# Patient Record
Sex: MALE | Race: OTHER | NOT HISPANIC OR LATINO
[De-identification: names, ages, dates, MRNs, and addresses within clinical notes are randomized per-mention and may not be internally consistent; named-entity substitution may affect disease eponyms.]

---
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
## 2024-01-01 VITALS — HEART RATE: 80 BPM | SYSTOLIC BLOOD PRESSURE: 122 MMHG | DIASTOLIC BLOOD PRESSURE: 74 MMHG

## 2024-01-02 ENCOUNTER — APPOINTMENT (OUTPATIENT)
Dept: CARE COORDINATION | Facility: CLINIC | Age: 19
End: 2024-01-02
Payer: MEDICAID

## 2024-01-02 PROCEDURE — 99457 RPM TX MGMT 1ST 20 MIN: CPT

## 2024-01-05 NOTE — ASSESSMENT
[FreeTextEntry1] : See summary report dated 1/1/2024 for synchronous communication and billable hours report dated 1/1/2024 for total minutes.   [employer] data reviewed; patient at goal with interactive encounters and monitoring. Plan is to graduate from program as adequate BP control maintained.

## 2024-01-05 NOTE — PLAN
PHYSICAL THERAPY EVALUATION NOTE    Patient Name: Desiree Hernandez  MNJRQ'S Date: 10/24/2019  AGE:   79 y o  Mrn:   [id number]  ADMIT DX:  Hypoxia [R09 02]  Borderline low oxygen saturation level [R79 81]  Severe sepsis (HCC) [A41 9, R65 20]  Open wound of right great toe, initial encounter [S91 101A]    Past Medical History:   Diagnosis Date    Actinic keratosis     Anemia     chronic hemolytic anemia    Atrial fibrillation (HCC)     Benign prostatic hypertrophy     Blood in urine     Cellulitis     Dermatitis     Disease of thyroid gland     Dyslipidemia     Epidural hematoma (HCC)     Epistaxis     Falling     Folate deficiency     GERD (gastroesophageal reflux disease)     Hip fracture (HCC)     Hyperlipidemia     Hypertension     Hypothyroidism (acquired)     Insomnia     Neuropathy     Nocturia     Osteoarthritis     Psychiatric disorder     depression    Renal disorder     Acute renal failure    Sepsis (HCC)     Shoulder pain, right     Squamous cell carcinoma     Subdural hematoma (HCC)     Thrombophlebitis     Urinary retention      Length Of Stay: 1  PHYSICAL THERAPY EVALUATION :    10/24/19 1019   Pain Assessment   Pain Assessment No/denies pain   Home Living   Type of Home SNF  ([facility])   Additional Comments mobilizes in wheelchair  performs pivot transfer w/ assist x2 or mobilized w/ mechanical lift  needs assist w/ ADLs  no falls in last 6 months  no history of supplemental oxygen use  Prior Function   Comments pt seen supine in bed  agreed to participate in PT eval  denied pain or dizziness  pt needed occasional input for task focus  pt wants to go home  Restrictions/Precautions   Other Precautions Contact/isolation;Droplet precautions; Bed Alarm;Multiple lines;Telemetry;O2;Fall Risk   General   Additional Pertinent History spoke to Dr Nunu Owen prior to session - stated pt is appropriate for PT eval and mobility assessment despite pending Podiatry consult  Family/Caregiver Present No   Cognition   Arousal/Participation Cooperative   Orientation Level Oriented to person; Other (Comment)  (pt was identified w/ full name, birth date)   Following Commands Follows one step commands inconsistently   Comments 2L oxygen via nasal cannula  resting pulse ox 98% and 58 BPM, active 94% and 63 BPM    RUE Assessment   RUE Assessment X  (3/5; shoulder 3-/5, elbow extension 3-/5)   LUE Assessment   LUE Assessment X  (3/5; shoulder 3-/5, elbow extension 3-/5)   RLE Assessment   RLE Assessment X  (3-/5)   LLE Assessment   LLE Assessment X  (3-/5, ankle 2+/5)   Coordination   Movements are Fluid and Coordinated 0   Coordination and Movement Description impaired coordination UEs   Bed Mobility   Rolling R 2  Maximal assistance   Additional items Assist x 1; Increased time required;Verbal cues;LE management  (for bedrail use, LE positioning, breathing technique)   Rolling L 2  Maximal assistance   Additional items Assist x 1; Increased time required;Verbal cues;LE management  (for bedrail use, LE positioning, breathing technique)   Supine to Sit 2  Maximal assistance   Additional items Assist x 2;HOB elevated; Increased time required;Verbal cues;LE management  (for bedrail use, breathing technique)   Sit to Supine 2  Maximal assistance   Additional items Assist x 2;Verbal cues;LE management  (for UE positioning, breathing technique)   Additional Comments pt sat edge of bed 6 minutes w/ mod to maxx1  additional sitting not possible due to fatigue  pt was unable to attempt mobilization out of bed  pt needs dependent means for out of bed mobilization     Transfers   Sit to Stand Unable to assess   Balance   Static Sitting Poor -   Static Standing Zero   Activity Tolerance   Activity Tolerance Patient limited by fatigue   Nurse Made Aware spoke to Dr Enrique JAQUEZ, Scott Montoya CM   Assessment   Prognosis Guarded Problem List Decreased strength;Decreased range of motion;Decreased endurance; Impaired balance;Decreased mobility; Decreased coordination;Decreased safety awareness;Decreased skin integrity   Assessment Pt presented to the ER tonight from [facility] with a temp of 101 8  Dx: severe sepsis, UTI, and right great toe open wound  order placed for PT eval and tx, w/ activity order of up and OOB as tolerated  pt presents w/ comorbidities of a-fib, right great toe wound, anemia, a-fib, cellulitis, dyslipidemia, hip fx surgery, HTN, neuropathy, OA, right shoulder pain, and SDH and personal factors of advanced age, mobilizing w/ assistive device, depression, inability to perform IADLs and inability to perform ADLs  pt presents w/ weakness, decreased ROM, decreased endurance, impaired balance, impaired coordination, decreased safety awareness, fall risk and impaired skin integrity  these impairments are evident in findings from physical examination (weakness, decreased ROM, impaired coordination and impaired skin integrity), mobility assessment (need for assist x1 to 2 w/ bed mobility, inability to mobilize out of bed, need for input for mobility and breathing technique), and Barthel Index: 5/100  pt needed input for task focus and mobility technique  pt is at risk for falls due to physical and safety awareness deficits  pt's clinical presentation is unstable/unpredictable (evident in need for assist x 1 to 2 w/ bed mobility, inability to mobilize out of bed, need for supplemental oxygen in order to maintain oxygen saturation and need for input for task focus and mobility technique/safety)  pt needs inpatient PT tx to improve mobility deficits and progress mobility training as appropriate  discharge recommendation is for outpatient PT to reduce fall risk and maximize level of functional independence  Pt needs dependent means for out of bed mobilization     Goals   Patient Goals go home   STG Expiration Date 11/03/19 [_____ Hours] : [unfilled] hours Short Term Goal #1 pt will: Increase bilateral LE strength 1/2 grade to facilitate independent mobility, Perform rolling and repositioning in bed w/ minx1 to decrease fall risk factors, Perform supine <---> sit transitions w/ modx1 to improve independence, Increase static sitting balance 1 grade to decrease risk for falls, Tolerate 3 hr OOB to faciliate upright tolerance, Tolerate seated at EOB 30 minutes w/ supervision to facilitate functional task performance and Improve Barthel Index score to 30 or greater to facilitate independence  PT to see when transfer training is appropriate  Plan   Treatment/Interventions LE strengthening/ROM; Therapeutic exercise; Endurance training;Cognitive reorientation;Patient/family training;Equipment eval/education; Bed mobility  (PT to see when transfer training is appropriate)   PT Frequency 2-3x/wk   Recommendation   Recommendation Other (Comment)  (PT at SNF)   Equipment Recommended Other (Comment)  (pt needs dependent means for out of bed mobilization )   Barthel Index   Feeding 5   Bathing 0   Grooming Score 0   Dressing Score 0   Bladder Score 0   Bowels Score 0   Toilet Use Score 0   Transfers (Bed/Chair) Score 0   Mobility (Level Surface) Score 0   Stairs Score 0   Barthel Index Score 5     Skilled PT recommended while in hospital and upon DC to progress pt toward treatment goals       Alicia Sun, PT [_____ Minutes] : [unfilled] minutes [Discharge] : Discharge

## 2024-01-08 ENCOUNTER — APPOINTMENT (OUTPATIENT)
Dept: INTERNAL MEDICINE | Facility: CLINIC | Age: 19
End: 2024-01-08
Payer: MEDICAID

## 2024-01-08 VITALS
OXYGEN SATURATION: 98 % | SYSTOLIC BLOOD PRESSURE: 141 MMHG | HEIGHT: 67 IN | DIASTOLIC BLOOD PRESSURE: 92 MMHG | BODY MASS INDEX: 33.9 KG/M2 | WEIGHT: 216 LBS | RESPIRATION RATE: 16 BRPM | TEMPERATURE: 97.2 F | HEART RATE: 129 BPM

## 2024-01-08 VITALS — SYSTOLIC BLOOD PRESSURE: 125 MMHG | DIASTOLIC BLOOD PRESSURE: 81 MMHG

## 2024-01-08 DIAGNOSIS — I99.8 OTHER DISORDER OF CIRCULATORY SYSTEM: ICD-10-CM

## 2024-01-08 DIAGNOSIS — E55.9 VITAMIN D DEFICIENCY, UNSPECIFIED: ICD-10-CM

## 2024-01-08 PROCEDURE — G2211 COMPLEX E/M VISIT ADD ON: CPT

## 2024-01-08 PROCEDURE — 99213 OFFICE O/P EST LOW 20 MIN: CPT

## 2024-01-09 PROBLEM — I99.8 FLUCTUATING BLOOD PRESSURE: Status: ACTIVE | Noted: 2024-01-09

## 2024-03-11 ENCOUNTER — APPOINTMENT (OUTPATIENT)
Dept: ENDOCRINOLOGY | Facility: CLINIC | Age: 19
End: 2024-03-11
Payer: MEDICAID

## 2024-03-11 VITALS
OXYGEN SATURATION: 99 % | TEMPERATURE: 97 F | HEIGHT: 67 IN | DIASTOLIC BLOOD PRESSURE: 83 MMHG | HEART RATE: 110 BPM | BODY MASS INDEX: 35 KG/M2 | SYSTOLIC BLOOD PRESSURE: 142 MMHG | WEIGHT: 223 LBS | RESPIRATION RATE: 16 BRPM

## 2024-03-11 DIAGNOSIS — E03.9 HYPOTHYROIDISM, UNSPECIFIED: ICD-10-CM

## 2024-03-11 DIAGNOSIS — E55.9 VITAMIN D DEFICIENCY, UNSPECIFIED: ICD-10-CM

## 2024-03-11 DIAGNOSIS — N50.0 ATROPHY OF TESTIS: ICD-10-CM

## 2024-03-11 DIAGNOSIS — I10 ESSENTIAL (PRIMARY) HYPERTENSION: ICD-10-CM

## 2024-03-11 DIAGNOSIS — Q53.10 UNSPECIFIED UNDESCENDED TESTICLE, UNILATERAL: ICD-10-CM

## 2024-03-11 DIAGNOSIS — E66.9 OBESITY, UNSPECIFIED: ICD-10-CM

## 2024-03-11 PROCEDURE — G2211 COMPLEX E/M VISIT ADD ON: CPT | Mod: NC,1L

## 2024-03-11 PROCEDURE — 99215 OFFICE O/P EST HI 40 MIN: CPT

## 2024-03-11 RX ORDER — KETOCONAZOLE 20.5 MG/ML
2 SHAMPOO, SUSPENSION TOPICAL
Qty: 1 | Refills: 0 | Status: ACTIVE | COMMUNITY
Start: 2020-10-30 | End: 1900-01-01

## 2024-03-11 NOTE — ASSESSMENT
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  Hypothyroidism, primary Diagnosed with hypothyroidism 5-6 years ago. Was in [facility] previously without access to regular medical care. Once [place] the [place], GI doctor who was doing EGD for H. pylori learned that patient's mother has hypothyroidism and patient also had some weight gain and labs showed hypothyroidism. Referred to pediatric endocrinologist. Was started on levothyroxine. Dose ultimately titrated up to 88mcg daily. Previously seen by Rm Lugo, pediatric endocrinologist ([phone]). Taking PO, first thing in morning, empty stomach. Energy is normal. No temperature intolerance, normal bowel movements. -Continue levothyroxine 88mcg daily -Recheck TSH with FT4 6 weeks after resuming 88mcg (was using half dose of mother's 150mcg tab until recently due to running out of his own)  HTN Hypothyroidism can cause increased PVR but unlikely to be the cause now given normal TSH. Secondary workup of HTN normal. Renal US pending.  -F/u with PCP for further workup. Had been offered cardio referral by PCP but patient declined. Right and left UE BP different by 20 mmHg. To discuss further with PCP/cardio. -Obesity can contribute to high BP. Nutrition referral given.  Vit D insufficiency Vit D 21.3.  -Recommend 2000 units D3 daily for goal 25 D >30.  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 Branden Prado but he is located too far from where patient and mother live. They'd have to take public transit and would rather not travel to [place]. Wishes to have low testo eval with me and fertility assessment by urology. -Check AM FSH, LH, testo, SHBG, CBC, PSA, CMP. If any abnormal, will proceed with confirmatory/additional testing. -Reviewed risks vs benefits of testosterone replacement therapy, including but not limited to potential increase in cardiovascular events, CVA, risk of prostate cancer, liver disease.  I spent 40 minutes on today's encounter, including record review, face-to-face care and additional coordination of care.   RTC 3 months  Bin Corado DO.

## 2024-03-11 NOTE — HISTORY OF PRESENT ILLNESS
[FreeTextEntry1] : Pt is a 16 y/o male with keratosis pilaris, hypothyroidism, HLD?, pre-DM?, and R testicular agenesis s/p exploratory laparoscopy and L testicular orchiopexy, presenting for hypothyroidism.  Diagnosed with hypothyroidism 5-6 years ago. Was in [facility] previously without access to regular medical care. [place] the [place], GI doctor who was doing EGD for H. pylori learned that patient's mother has hypothyroidism and patient also had some weight gain and labs showed hypothyroidism. Referred to pediatric endocrinologist. Was started on levothyroxine. Dose ultimately titrated up to 88mcg daily. Previously seen by Rm Lugo, pediatric endocrinologist ([phone]). Taking PO, first thing in morning, empty stomach. Vit D 21.3. On 2000 units daily. Energy is normal. No temperature intolerance, normal bowel movements.  No use of supplements, lithium, amiodarone, hx of radiation to head or neck. No prior PIMENTEL, thyroid surgery or prior treatment of hyperthyroidism. No hx of CAD, arrhythmia, CHF.  FHx of hashimoto's in mother.  Saw urology, referred to Dr. Branden Prado but he is located too far from where patient and mother live. They'd have to take public transit and would rather not travel to [place]. Wishes to have low testo eval with me and fertility assessment by urology.  Denies daytime somnolence, no weight changes, testicular trauma. Had delayed puberty, was around age 15. Was on testo a few times. No ED. Gets morning erections, anosmia, gynecomastia, dental deformities. Injured left knee after falling when kicking a ball at school but does not think it was a fracture. No increased urinary frequency, urgency, nocturia, slow stream, postmicturition dribble symptoms. No n/v/d/c. No change in voice, peripheral vision loss, HA, change in muscle strength, body hair loss, opioid use. No use of supplements. Normal libido.

## 2024-03-11 NOTE — PHYSICAL EXAM
[Alert] : alert [Well Nourished] : well nourished [No Acute Distress] : no acute distress [No Proptosis] : no proptosis [EOMI] : extra ocular movement intact [No Lid Lag] : no lid lag [Supple] : the neck was supple [Thyroid Not Enlarged] : the thyroid was not enlarged [No Thyroid Nodules] : no palpable thyroid nodules [No Accessory Muscle Use] : no accessory muscle use [No Respiratory Distress] : no respiratory distress [Normal Rate and Effort] : normal respiratory rate and effort [Normal Rate] : heart rate was normal [Regular Rhythm] : with a regular rhythm [No Edema] : no peripheral edema [Not Tender] : non-tender [Soft] : abdomen soft [No Spinal Tenderness] : no spinal tenderness [Spine Straight] : spine straight [No Involuntary Movements] : no involuntary movements were seen [Cranial Nerves Intact] : cranial nerves 2-12 were intact [No Motor Deficits] : the motor exam was normal [Normal Reflexes] : deep tendon reflexes were 2+ and symmetric [Oriented x3] : oriented to person, place, and time [Normal Affect] : the affect was normal [Normal Mood] : the mood was normal [Penis Abnormality] : the penis was normal [Scrotum] : the scrotum was normal [Normal Pattern Pubic Hair] : normal male pattern pubic hair [FreeTextEntry1] : Left testis 20cc. Right testis non-palpable. [de-identified] : No gynecomastia.

## 2024-06-11 LAB
25(OH)D3 SERPL-MCNC: 32.3 NG/ML
ALBUMIN SERPL ELPH-MCNC: 4.8 G/DL
ALP BLD-CCNC: 104 U/L
ALT SERPL-CCNC: 16 U/L
ANION GAP SERPL CALC-SCNC: 19 MMOL/L
AST SERPL-CCNC: 19 U/L
BASOPHILS # BLD AUTO: 0.05 K/UL
BASOPHILS NFR BLD AUTO: 0.6 %
BILIRUB SERPL-MCNC: 0.5 MG/DL
BUN SERPL-MCNC: 12 MG/DL
CALCIUM SERPL-MCNC: 9.8 MG/DL
CHLORIDE SERPL-SCNC: 101 MMOL/L
CO2 SERPL-SCNC: 20 MMOL/L
CREAT SERPL-MCNC: 0.73 MG/DL
EGFR: 134 ML/MIN/1.73M2
EOSINOPHIL # BLD AUTO: 0.1 K/UL
EOSINOPHIL NFR BLD AUTO: 1.2 %
FSH SERPL-MCNC: 2.6 IU/L
GLUCOSE SERPL-MCNC: 72 MG/DL
HCT VFR BLD CALC: 41.3 %
HGB BLD-MCNC: 13.6 G/DL
IMM GRANULOCYTES NFR BLD AUTO: 0.4 %
LH SERPL-ACNC: 3.7 IU/L
LYMPHOCYTES # BLD AUTO: 3.17 K/UL
LYMPHOCYTES NFR BLD AUTO: 37.3 %
MAN DIFF?: NORMAL
MCHC RBC-ENTMCNC: 28.6 PG
MCHC RBC-ENTMCNC: 32.9 GM/DL
MCV RBC AUTO: 86.9 FL
MONOCYTES # BLD AUTO: 0.52 K/UL
MONOCYTES NFR BLD AUTO: 6.1 %
NEUTROPHILS # BLD AUTO: 4.62 K/UL
NEUTROPHILS NFR BLD AUTO: 54.4 %
PLATELET # BLD AUTO: 357 K/UL
POTASSIUM SERPL-SCNC: 4.4 MMOL/L
PROT SERPL-MCNC: 7.6 G/DL
PSA FREE FLD-MCNC: 53 %
PSA FREE SERPL-MCNC: 0.26 NG/ML
PSA SERPL-MCNC: 0.5 NG/ML
RBC # BLD: 4.75 M/UL
RBC # FLD: 12 %
SHBG SERPL-SCNC: 15.8 NMOL/L
SODIUM SERPL-SCNC: 141 MMOL/L
T4 FREE SERPL-MCNC: 1.6 NG/DL
TESTOST SERPL-MCNC: 214 NG/DL
TSH SERPL-ACNC: 7.44 UIU/ML
WBC # FLD AUTO: 8.49 K/UL

## 2024-06-11 RX ORDER — LEVOTHYROXINE SODIUM 0.1 MG/1
100 TABLET ORAL
Qty: 90 | Refills: 1 | Status: ACTIVE | COMMUNITY
Start: 2019-11-21 | End: 1900-01-01

## 2024-06-11 RX ORDER — MULTIVIT-MIN/FOLIC/VIT K/LYCOP 400-300MCG
50 MCG TABLET ORAL
Qty: 90 | Refills: 1 | Status: ACTIVE | COMMUNITY
Start: 2023-09-11 | End: 1900-01-01

## 2024-07-08 ENCOUNTER — APPOINTMENT (OUTPATIENT)
Dept: INTERNAL MEDICINE | Facility: CLINIC | Age: 19
End: 2024-07-08

## 2024-08-12 ENCOUNTER — APPOINTMENT (OUTPATIENT)
Dept: ENDOCRINOLOGY | Facility: CLINIC | Age: 19
End: 2024-08-12
Payer: MEDICAID

## 2024-08-12 VITALS
BODY MASS INDEX: 36.1 KG/M2 | DIASTOLIC BLOOD PRESSURE: 79 MMHG | RESPIRATION RATE: 16 BRPM | OXYGEN SATURATION: 98 % | HEART RATE: 81 BPM | SYSTOLIC BLOOD PRESSURE: 159 MMHG | WEIGHT: 230 LBS | TEMPERATURE: 97 F | HEIGHT: 67 IN

## 2024-08-12 DIAGNOSIS — Q53.10 UNSPECIFIED UNDESCENDED TESTICLE, UNILATERAL: ICD-10-CM

## 2024-08-12 DIAGNOSIS — E03.9 HYPOTHYROIDISM, UNSPECIFIED: ICD-10-CM

## 2024-08-12 DIAGNOSIS — E66.9 OBESITY, UNSPECIFIED: ICD-10-CM

## 2024-08-12 DIAGNOSIS — E55.9 VITAMIN D DEFICIENCY, UNSPECIFIED: ICD-10-CM

## 2024-08-12 DIAGNOSIS — N50.0 ATROPHY OF TESTIS: ICD-10-CM

## 2024-08-12 DIAGNOSIS — I10 ESSENTIAL (PRIMARY) HYPERTENSION: ICD-10-CM

## 2024-08-12 DIAGNOSIS — E29.1 TESTICULAR HYPOFUNCTION: ICD-10-CM

## 2024-08-12 PROCEDURE — G2211 COMPLEX E/M VISIT ADD ON: CPT | Mod: NC,1L

## 2024-08-12 PROCEDURE — 99214 OFFICE O/P EST MOD 30 MIN: CPT

## 2024-08-12 NOTE — HISTORY OF PRESENT ILLNESS
[FreeTextEntry1] : Pt is a 16 y/o male with keratosis pilaris, hypothyroidism, HLD?, pre-DM?, and R testicular agenesis s/p exploratory laparoscopy and L testicular orchiopexy, presenting for hypothyroidism.  Diagnosed with hypothyroidism 5-6 years ago. Was in [facility] previously without access to regular medical care. [place] the [place], GI doctor who was doing EGD for H. pylori learned that patient's mother has hypothyroidism and patient also had some weight gain and labs showed hypothyroidism. Referred to pediatric endocrinologist. Was started on levothyroxine. Dose ultimately titrated up to 100mcg daily. Previously seen by Rm Lugo, pediatric endocrinologist ([phone]). Taking PO, first thing in morning, empty stomach. Vit D 32.3. On 2000 units daily. Energy is normal. No temperature intolerance, normal bowel movements.  No use of supplements, lithium, amiodarone, hx of radiation to head or neck. No prior PIMENTEL, thyroid surgery or prior treatment of hyperthyroidism. No hx of CAD, arrhythmia, CHF.  FHx of hashimoto's in mother.  Saw urology, referred to Dr. Branden Prado but he is located too far from where patient and mother live. They'd have to take public transit and would rather not travel to [place]. Wishes to have low testo eval with me and fertility assessment by urology.  Denies daytime somnolence, no weight changes, testicular trauma. Had delayed puberty, was around age 15. Was on testo a few times as a child. No ED. Gets morning erections, anosmia, gynecomastia, dental deformities. Injured left knee after falling when kicking a ball at school but does not think it was a fracture. No increased urinary frequency, urgency, nocturia, slow stream, postmicturition dribble symptoms. No n/v/d/c. No change in voice, peripheral vision loss, HA, change in muscle strength, body hair loss, opioid use. No use of supplements. Normal libido.  Repeat testo 193 with inappropriately normal gonadotropins. Free testo by calculation slightly low. Bioavailable has no reference range at [facility].  Mother with partial empty sella.

## 2024-08-12 NOTE — PHYSICAL EXAM
[Alert] : alert [Well Nourished] : well nourished [No Acute Distress] : no acute distress [EOMI] : extra ocular movement intact [No Proptosis] : no proptosis [Cranial Nerves Intact] : cranial nerves 2-12 were intact [No Motor Deficits] : the motor exam was normal [Oriented x3] : oriented to person, place, and time [Normal Affect] : the affect was normal [Normal Mood] : the mood was normal

## 2024-08-12 NOTE — ASSESSMENT
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  Hypothyroidism, primary Diagnosed with hypothyroidism 5-6 years ago. Was in [facility] previously without access to regular medical care. [place] the [place], [place] doctor who was doing EGD for H. pylori learned that patient's mother has hypothyroidism and patient also had some weight gain and labs showed hypothyroidism. Referred to pediatric endocrinologist. Was started on levothyroxine. Dose ultimately titrated up to 88mcg daily. Previously seen by Rm Lugo, pediatric endocrinologist ([phone]). Taking PO, first thing in morning, empty stomach. Energy is normal. No temperature intolerance, normal bowel movements. -Continue levothyroxine 100mcg daily  HTN Hypothyroidism can cause increased PVR but unlikely to be the cause now given normal TSH. Secondary workup of HTN normal. Renal US previously done? -F/u with PCP for further workup. Had been offered cardio referral by PCP but patient declined.  -Obesity can contribute to high BP. Nutrition referral given.  Vit D insufficiency Vit D now above 30. -Recommend continue 2000 units D3 daily for goal 25 D >30.  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 Branden Prado but he is located too far from where patient and mother live. They'd have to take public transit and would rather not travel to [place]. Wishes to have low testo eval with me and fertility assessment by urology. Repeat AM fasting testo 193 with inappropriately normal gonadotropins. -MRI sella -Check fasting AM IGF-1, PRL, AM cortisol, ACTH, DHEAS, BMP, Fe, transferrin. -Reviewed risks vs benefits of testosterone replacement therapy, including but not limited to potential increase in cardiovascular events, CVA, risk of prostate cancer, liver disease.  RTC 3 months  Bin Corado DO.

## 2024-08-12 NOTE — REASON FOR VISIT
[Follow - Up] : a follow-up visit [Hypothyroidism] : hypothyroidism [Hypogonadism] : hypogonadism [Parent] : parent

## 2024-08-21 ENCOUNTER — APPOINTMENT (OUTPATIENT)
Dept: MRI IMAGING | Facility: CLINIC | Age: 19
End: 2024-08-21
Payer: MEDICAID

## 2024-08-21 PROCEDURE — 70553 MRI BRAIN STEM W/O & W/DYE: CPT

## 2024-08-21 PROCEDURE — A9585: CPT
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
## 2024-11-11 ENCOUNTER — APPOINTMENT (OUTPATIENT)
Dept: ENDOCRINOLOGY | Facility: CLINIC | Age: 19
End: 2024-11-11
Payer: MEDICAID

## 2024-11-11 VITALS
HEART RATE: 86 BPM | TEMPERATURE: 98 F | BODY MASS INDEX: 35.94 KG/M2 | DIASTOLIC BLOOD PRESSURE: 95 MMHG | HEIGHT: 67 IN | RESPIRATION RATE: 16 BRPM | OXYGEN SATURATION: 99 % | SYSTOLIC BLOOD PRESSURE: 168 MMHG | WEIGHT: 229 LBS

## 2024-11-11 VITALS — DIASTOLIC BLOOD PRESSURE: 82 MMHG | SYSTOLIC BLOOD PRESSURE: 145 MMHG

## 2024-11-11 DIAGNOSIS — E03.9 HYPOTHYROIDISM, UNSPECIFIED: ICD-10-CM

## 2024-11-11 DIAGNOSIS — E66.9 OBESITY, UNSPECIFIED: ICD-10-CM

## 2024-11-11 DIAGNOSIS — I10 ESSENTIAL (PRIMARY) HYPERTENSION: ICD-10-CM

## 2024-11-11 DIAGNOSIS — E55.9 VITAMIN D DEFICIENCY, UNSPECIFIED: ICD-10-CM

## 2024-11-11 DIAGNOSIS — Q53.10 UNSPECIFIED UNDESCENDED TESTICLE, UNILATERAL: ICD-10-CM

## 2024-11-11 DIAGNOSIS — E78.00 PURE HYPERCHOLESTEROLEMIA, UNSPECIFIED: ICD-10-CM

## 2024-11-11 DIAGNOSIS — E29.1 TESTICULAR HYPOFUNCTION: ICD-10-CM

## 2024-11-11 DIAGNOSIS — N50.0 ATROPHY OF TESTIS: ICD-10-CM

## 2024-11-11 PROCEDURE — G2211 COMPLEX E/M VISIT ADD ON: CPT | Mod: NC

## 2024-11-11 PROCEDURE — 99214 OFFICE O/P EST MOD 30 MIN: CPT

## 2024-11-14 ENCOUNTER — TRANSCRIPTION ENCOUNTER (OUTPATIENT)
Age: 19
End: 2024-11-14

## 2025-05-05 ENCOUNTER — APPOINTMENT (OUTPATIENT)
Dept: ENDOCRINOLOGY | Facility: CLINIC | Age: 20
End: 2025-05-05
Payer: MEDICAID

## 2025-05-05 VITALS
WEIGHT: 217 LBS | TEMPERATURE: 98 F | OXYGEN SATURATION: 98 % | SYSTOLIC BLOOD PRESSURE: 146 MMHG | DIASTOLIC BLOOD PRESSURE: 93 MMHG | RESPIRATION RATE: 16 BRPM | HEART RATE: 102 BPM

## 2025-05-05 DIAGNOSIS — Q53.10 UNSPECIFIED UNDESCENDED TESTICLE, UNILATERAL: ICD-10-CM

## 2025-05-05 DIAGNOSIS — E66.9 OBESITY, UNSPECIFIED: ICD-10-CM

## 2025-05-05 DIAGNOSIS — N50.0 ATROPHY OF TESTIS: ICD-10-CM

## 2025-05-05 DIAGNOSIS — E03.9 HYPOTHYROIDISM, UNSPECIFIED: ICD-10-CM

## 2025-05-05 DIAGNOSIS — R09.82 POSTNASAL DRIP: ICD-10-CM

## 2025-05-05 DIAGNOSIS — E29.1 TESTICULAR HYPOFUNCTION: ICD-10-CM

## 2025-05-05 DIAGNOSIS — I10 ESSENTIAL (PRIMARY) HYPERTENSION: ICD-10-CM

## 2025-05-05 DIAGNOSIS — E78.00 PURE HYPERCHOLESTEROLEMIA, UNSPECIFIED: ICD-10-CM

## 2025-05-05 PROCEDURE — G2211 COMPLEX E/M VISIT ADD ON: CPT | Mod: NC

## 2025-05-05 PROCEDURE — 99215 OFFICE O/P EST HI 40 MIN: CPT

## 2025-05-05 RX ORDER — CETIRIZINE HYDROCHLORIDE 10 MG/1
10 TABLET, COATED ORAL
Qty: 90 | Refills: 0 | Status: ACTIVE | COMMUNITY
Start: 2025-05-05 | End: 1900-01-01

## 2025-05-06 RX ORDER — TESTOSTERONE CYPIONATE 200 MG/ML
200 INJECTION, SOLUTION INTRAMUSCULAR
Qty: 2 | Refills: 0 | Status: ACTIVE | COMMUNITY
Start: 2025-05-05

## 2025-06-20 ENCOUNTER — APPOINTMENT (OUTPATIENT)
Dept: INTERNAL MEDICINE | Facility: CLINIC | Age: 20
End: 2025-06-20
Payer: MEDICAID

## 2025-06-20 ENCOUNTER — OUTPATIENT (OUTPATIENT)
Dept: OUTPATIENT SERVICES | Facility: HOSPITAL | Age: 20
LOS: 1 days | End: 2025-06-20
Payer: MEDICAID

## 2025-06-20 ENCOUNTER — NON-APPOINTMENT (OUTPATIENT)
Age: 20
End: 2025-06-20

## 2025-06-20 VITALS
OXYGEN SATURATION: 99 % | SYSTOLIC BLOOD PRESSURE: 140 MMHG | HEIGHT: 67 IN | HEART RATE: 120 BPM | BODY MASS INDEX: 34.53 KG/M2 | DIASTOLIC BLOOD PRESSURE: 90 MMHG | TEMPERATURE: 97.2 F | WEIGHT: 220 LBS

## 2025-06-20 DIAGNOSIS — Z98.890 OTHER SPECIFIED POSTPROCEDURAL STATES: Chronic | ICD-10-CM

## 2025-06-20 DIAGNOSIS — Z00.00 ENCOUNTER FOR GENERAL ADULT MEDICAL EXAMINATION WITHOUT ABNORMAL FINDINGS: ICD-10-CM

## 2025-06-20 PROCEDURE — G0463: CPT

## 2025-06-20 PROCEDURE — 99213 OFFICE O/P EST LOW 20 MIN: CPT | Mod: GC,25

## 2025-06-20 PROCEDURE — 99395 PREV VISIT EST AGE 18-39: CPT | Mod: 25,GC

## 2025-06-20 RX ORDER — LOSARTAN POTASSIUM 25 MG/1
25 TABLET, FILM COATED ORAL DAILY
Qty: 1 | Refills: 1 | Status: ACTIVE | COMMUNITY
Start: 2025-06-20 | End: 1900-01-01

## 2025-06-25 DIAGNOSIS — E66.9 OBESITY, UNSPECIFIED: ICD-10-CM

## 2025-06-25 DIAGNOSIS — E03.9 HYPOTHYROIDISM, UNSPECIFIED: ICD-10-CM

## 2025-06-25 DIAGNOSIS — E29.1 TESTICULAR HYPOFUNCTION: ICD-10-CM

## 2025-06-25 DIAGNOSIS — I10 ESSENTIAL (PRIMARY) HYPERTENSION: ICD-10-CM

## 2025-07-08 ENCOUNTER — NON-APPOINTMENT (OUTPATIENT)
Age: 20
End: 2025-07-08

## 2025-07-08 ENCOUNTER — APPOINTMENT (OUTPATIENT)
Dept: INTERNAL MEDICINE | Facility: CLINIC | Age: 20
End: 2025-07-08

## 2025-07-08 VITALS
DIASTOLIC BLOOD PRESSURE: 81 MMHG | OXYGEN SATURATION: 99 % | RESPIRATION RATE: 16 BRPM | SYSTOLIC BLOOD PRESSURE: 135 MMHG | TEMPERATURE: 97.1 F | HEART RATE: 82 BPM | WEIGHT: 218 LBS

## 2025-07-08 RX ORDER — MENINGOCOCCAL GROUP B VACCINE 60; 60 UG/.5ML; UG/.5ML
INJECTION, SUSPENSION INTRAMUSCULAR
Qty: 1 | Refills: 0 | Status: ACTIVE | COMMUNITY
Start: 2025-07-08 | End: 1900-01-01

## 2025-07-11 ENCOUNTER — TRANSCRIPTION ENCOUNTER (OUTPATIENT)
Age: 20
End: 2025-07-11

## 2025-07-11 ENCOUNTER — APPOINTMENT (OUTPATIENT)
Dept: ULTRASOUND IMAGING | Facility: CLINIC | Age: 20
End: 2025-07-11
Payer: MEDICAID

## 2025-07-11 PROCEDURE — 76775 US EXAM ABDO BACK WALL LIM: CPT

## 2025-07-18 ENCOUNTER — LABORATORY RESULT (OUTPATIENT)
Age: 20
End: 2025-07-18

## 2025-07-23 ENCOUNTER — NON-APPOINTMENT (OUTPATIENT)
Age: 20
End: 2025-07-23

## 2025-08-01 ENCOUNTER — OUTPATIENT (OUTPATIENT)
Dept: OUTPATIENT SERVICES | Facility: HOSPITAL | Age: 20
LOS: 1 days | End: 2025-08-01
Payer: MEDICAID

## 2025-08-01 ENCOUNTER — APPOINTMENT (OUTPATIENT)
Dept: INTERNAL MEDICINE | Facility: CLINIC | Age: 20
End: 2025-08-01
Payer: MEDICAID

## 2025-08-01 VITALS
OXYGEN SATURATION: 98 % | SYSTOLIC BLOOD PRESSURE: 122 MMHG | TEMPERATURE: 97.5 F | WEIGHT: 230 LBS | DIASTOLIC BLOOD PRESSURE: 80 MMHG | HEART RATE: 102 BPM | HEIGHT: 67 IN | BODY MASS INDEX: 36.1 KG/M2

## 2025-08-01 DIAGNOSIS — Z98.890 OTHER SPECIFIED POSTPROCEDURAL STATES: Chronic | ICD-10-CM

## 2025-08-01 DIAGNOSIS — Z00.00 ENCOUNTER FOR GENERAL ADULT MEDICAL EXAMINATION WITHOUT ABNORMAL FINDINGS: ICD-10-CM

## 2025-08-01 PROCEDURE — G0463: CPT

## 2025-08-01 PROCEDURE — G2211 COMPLEX E/M VISIT ADD ON: CPT | Mod: NC

## 2025-08-01 PROCEDURE — 99214 OFFICE O/P EST MOD 30 MIN: CPT

## 2025-08-04 ENCOUNTER — TRANSCRIPTION ENCOUNTER (OUTPATIENT)
Age: 20
End: 2025-08-04

## 2025-08-07 DIAGNOSIS — I10 ESSENTIAL (PRIMARY) HYPERTENSION: ICD-10-CM

## 2025-08-07 DIAGNOSIS — E29.1 TESTICULAR HYPOFUNCTION: ICD-10-CM

## 2025-08-13 ENCOUNTER — APPOINTMENT (OUTPATIENT)
Dept: ENDOCRINOLOGY | Facility: CLINIC | Age: 20
End: 2025-08-13
Payer: MEDICAID

## 2025-08-13 VITALS
DIASTOLIC BLOOD PRESSURE: 77 MMHG | TEMPERATURE: 97.6 F | WEIGHT: 222 LBS | SYSTOLIC BLOOD PRESSURE: 145 MMHG | OXYGEN SATURATION: 98 % | BODY MASS INDEX: 34.84 KG/M2 | HEIGHT: 67 IN | HEART RATE: 71 BPM | RESPIRATION RATE: 16 BRPM

## 2025-08-13 DIAGNOSIS — E55.9 VITAMIN D DEFICIENCY, UNSPECIFIED: ICD-10-CM

## 2025-08-13 DIAGNOSIS — E29.1 TESTICULAR HYPOFUNCTION: ICD-10-CM

## 2025-08-13 DIAGNOSIS — Q53.10 UNSPECIFIED UNDESCENDED TESTICLE, UNILATERAL: ICD-10-CM

## 2025-08-13 DIAGNOSIS — E66.9 OBESITY, UNSPECIFIED: ICD-10-CM

## 2025-08-13 DIAGNOSIS — E03.9 HYPOTHYROIDISM, UNSPECIFIED: ICD-10-CM

## 2025-08-13 DIAGNOSIS — I10 ESSENTIAL (PRIMARY) HYPERTENSION: ICD-10-CM

## 2025-08-13 DIAGNOSIS — N50.0 ATROPHY OF TESTIS: ICD-10-CM

## 2025-08-13 PROCEDURE — G2211 COMPLEX E/M VISIT ADD ON: CPT | Mod: NC

## 2025-08-13 PROCEDURE — 99215 OFFICE O/P EST HI 40 MIN: CPT

## 2025-08-13 RX ORDER — PHENTERMINE HYDROCHLORIDE 37.5 MG/1
37.5 TABLET ORAL
Qty: 1 | Refills: 3 | Status: ACTIVE | COMMUNITY
Start: 2025-08-13 | End: 1900-01-01

## 2025-08-13 RX ORDER — TOPIRAMATE 25 MG/1
25 TABLET, FILM COATED ORAL
Qty: 60 | Refills: 3 | Status: ACTIVE | COMMUNITY
Start: 2025-08-13 | End: 1900-01-01

## 2025-08-20 ENCOUNTER — TRANSCRIPTION ENCOUNTER (OUTPATIENT)
Age: 20
End: 2025-08-20

## 2025-08-21 ENCOUNTER — TRANSCRIPTION ENCOUNTER (OUTPATIENT)
Age: 20
End: 2025-08-21

## 2025-09-17 ENCOUNTER — TRANSCRIPTION ENCOUNTER (OUTPATIENT)
Age: 20
End: 2025-09-17